# Patient Record
Sex: FEMALE | Race: WHITE | NOT HISPANIC OR LATINO | Employment: FULL TIME | URBAN - METROPOLITAN AREA
[De-identification: names, ages, dates, MRNs, and addresses within clinical notes are randomized per-mention and may not be internally consistent; named-entity substitution may affect disease eponyms.]

---

## 2021-02-23 ENCOUNTER — IMMUNIZATION (OUTPATIENT)
Dept: VACCINE CLINIC | Facility: HOSPITAL | Age: 51
End: 2021-02-23

## 2021-02-23 PROCEDURE — 91300 HC SARSCOV02 VAC 30MCG/0.3ML IM: CPT | Performed by: INTERNAL MEDICINE

## 2021-02-23 PROCEDURE — 0001A: CPT | Performed by: INTERNAL MEDICINE

## 2021-02-24 ENCOUNTER — APPOINTMENT (OUTPATIENT)
Dept: VACCINE CLINIC | Facility: HOSPITAL | Age: 51
End: 2021-02-24

## 2021-03-16 ENCOUNTER — IMMUNIZATION (OUTPATIENT)
Dept: VACCINE CLINIC | Facility: HOSPITAL | Age: 51
End: 2021-03-16

## 2021-03-16 PROCEDURE — 91300 HC SARSCOV02 VAC 30MCG/0.3ML IM: CPT | Performed by: INTERNAL MEDICINE

## 2021-03-16 PROCEDURE — 0002A: CPT | Performed by: INTERNAL MEDICINE

## 2023-06-06 ENCOUNTER — TRANSCRIBE ORDERS (OUTPATIENT)
Dept: GENERAL RADIOLOGY | Facility: HOSPITAL | Age: 53
End: 2023-06-06
Payer: COMMERCIAL

## 2023-06-06 ENCOUNTER — HOSPITAL ENCOUNTER (OUTPATIENT)
Dept: GENERAL RADIOLOGY | Facility: HOSPITAL | Age: 53
Discharge: HOME | End: 2023-06-06
Admitting: INTERNAL MEDICINE
Payer: COMMERCIAL

## 2023-06-06 DIAGNOSIS — M79.642 LEFT HAND PAIN: Primary | ICD-10-CM

## 2023-06-06 DIAGNOSIS — M79.642 LEFT HAND PAIN: ICD-10-CM

## 2023-06-06 PROCEDURE — 73130 X-RAY EXAM OF HAND: CPT

## 2023-08-28 ENCOUNTER — OFFICE VISIT (OUTPATIENT)
Dept: SLEEP MEDICINE | Facility: CLINIC | Age: 53
End: 2023-08-28
Payer: COMMERCIAL

## 2023-08-28 VITALS — WEIGHT: 159 LBS | OXYGEN SATURATION: 96 % | HEIGHT: 67 IN | HEART RATE: 90 BPM | BODY MASS INDEX: 24.96 KG/M2

## 2023-08-28 DIAGNOSIS — R06.83 SNORING: Primary | ICD-10-CM

## 2023-08-28 PROCEDURE — 99204 OFFICE O/P NEW MOD 45 MIN: CPT | Performed by: INTERNAL MEDICINE

## 2023-08-28 RX ORDER — THIAMINE HCL 100 MG
TABLET ORAL DAILY
COMMUNITY

## 2023-08-28 RX ORDER — VITAMIN A, ASCORBIC ACID, CHOLECALCIFEROL, ALPHA-TOCOPHEROL ACETATE, THIAMINE HYDROCHLORIDE, RIBOFLAVIN 5-PHOSPHATE SODIUM, NIACINAMIDE, PYRIDOXINE HYDROCHLORIDE, FERROUS SULFATE AND SODIUM FLUORIDE 1500; 35; 400; 5; .5; .6; 8; .4; 10; .25 [IU]/ML; MG/ML; [IU]/ML; [IU]/ML; MG/ML; MG/ML; MG/ML; MG/ML; MG/ML; MG/ML
LIQUID ORAL
COMMUNITY

## 2023-08-28 RX ORDER — VITAMIN E (DL,TOCOPHERYL ACET) 45 MG/0.25
DROPS ORAL
COMMUNITY

## 2023-08-28 NOTE — PROGRESS NOTES
New Sleep Patient Office Visit      Patient Name: Mary Pierce    Referring Physician: No ref. provider found    Chief Complaint:    Chief Complaint   Patient presents with    Sleeping Problem       History of Present Illness: Mary Pierce is a 52 y.o. female who is here today to establish care with Sleep Medicine.     52-year-old female with no significant past medical history presenting here for initial evaluation.  Patient states that her  was recently diagnosed with sleep apnea and she got concerned about as she also has history of snoring.  Patient has been complains about her snoring and more so now since he is on CPAP therapy himself and patient says snoring bothers him.  Patient denies any snore arousals.  Denies any waking up episodes gasping for air.  She states that normally she goes to sleep easily within 10 to 20 minutes going to bed.  She then except maybe once during the night to use the restroom.  Occasionally she is having some feeling like hot flashes during the night especially if she has caffeine late in the evening but that is not happening that frequently.  She denies any waking up with shortness of breath episodes.  When she wakes up in the morning it takes her about 15 to 20 minutes to get out of bed but once she is up she does well.  She works from home as a  and states that work has been going well.  Denies any problem doing her daily work activities due to sleep.  Denies any driving problems or sleep-related accidents.  States that she is very active and hiking and does not have any issues doing any of those activity due to fatigue or sleepiness.  She has occasional dry mouth when she wakes up.  Denies any headaches.  Denies any heartburn or reflux symptoms.  Denies any restless leg symptoms.  No other parasomnias or symptoms concerning for narcolepsy.  Denies any driving problems or sleep-related accidents.    Patient does not smoke.   Occasional alcohol intake maybe 2-4 times a month with 1 to 2 glasses of wine.  No other illicit drug use.  Occasional tea intake and occasional soda.  Denies any excessive caffeine intake either.    Further sleep history is as below.    [place] Scale: 5/24    Estimated average amount of sleep per night: 7-8 h  Number of times  she wakes up at night: 1  Difficulty falling back asleep: no  It usually takes 10 minutes to go to sleep.  She feels sleepy upon waking up: occasional  Rotating or night shift work: no    Drowsiness/Sleepiness:  She exhibits the following:  excessive daytime fatigue    Snoring/Breathing:  She exhibits the following:  loud snoring  snores in all sleep positions  awoken with dry mouth    Reflux:  She describes the following:  NA    Narcolepsy:  She exhibits the following:  none    RLS/PLMs:  She describes the following:  none    Insomnia:  She describes the following:  NA    Parasomnia:  She exhibits the following:  NA    Weight:  Weight change in the last year:  None    Subjective      Review of Systems:   Review of Systems   Constitutional: Negative.    HENT: Negative.     Respiratory: Negative.     Cardiovascular: Negative.    Gastrointestinal: Negative.    Endocrine: Negative.    Musculoskeletal: Negative.    Skin: Negative.    Neurological: Negative.    Hematological: Negative.    Psychiatric/Behavioral: Negative.     All other systems reviewed and are negative.    Past Medical History: History reviewed. No pertinent past medical history.    Past Surgical History:   Past Surgical History:   Procedure Laterality Date    HYSTERECTOMY         Family History:   Family History   Problem Relation Age of Onset    Cancer Mother     Obesity Mother     Hypertension Mother     Sleep apnea Father     Hypertension Father     Parkinsonism Maternal Grandmother        Social History:   Social History     Socioeconomic History    Marital status:    Tobacco Use    Smoking status: Never    Smokeless  "tobacco: Never   Substance and Sexual Activity    Alcohol use: Yes    Drug use: Never       Medications:     Current Outpatient Medications:     Calcium Citrate-Vitamin D3 (CITRACAL) 315-6.25 MG-MCG tablet tablet, Take  by mouth Daily., Disp: , Rfl:     Ped Multivitamins-Fl-Iron (Multi-Vitamin/Fluoride/Iron) 0.25-10 MG/ML solution, Take  by mouth., Disp: , Rfl:     Probiotic Product (Probiotic 10 Ultra Strength) capsule, Take  by mouth., Disp: , Rfl:     Allergies:   No Known Allergies    Objective     Physical Exam:  Vital Signs:   Vitals:    08/28/23 1530   Pulse: 90   SpO2: 96%   Weight: 72.1 kg (159 lb)   Height: 170.2 cm (67\")     Body mass index is 24.9 kg/mý.    Physical Exam  Vitals and nursing note reviewed.   Constitutional:       General: She is not in acute distress.     Appearance: She is well-developed. She is not diaphoretic.   HENT:      Head: Normocephalic and atraumatic.      Comments: Mallampati 2 airway, redundant soft palate, Mild overbite. Mild retrognathia     Nose: Nose normal.      Mouth/Throat:      Pharynx: No oropharyngeal exudate.   Eyes:      General:         Right eye: No discharge.         Left eye: No discharge.      Pupils: Pupils are equal, round, and reactive to light.   Neck:      Thyroid: No thyromegaly.      Trachea: No tracheal deviation.   Cardiovascular:      Rate and Rhythm: Normal rate and regular rhythm.      Heart sounds: Normal heart sounds. No murmur heard.    No friction rub. No gallop.   Pulmonary:      Effort: Pulmonary effort is normal. No respiratory distress.      Breath sounds: Normal breath sounds. No wheezing or rales.   Musculoskeletal:         General: No tenderness.      Cervical back: Neck supple.      Right lower leg: No edema.      Left lower leg: No edema.   Neurological:      Mental Status: She is alert and oriented to person, place, and time.      Cranial Nerves: No cranial nerve deficit.   Psychiatric:         Behavior: Behavior normal.         " Thought Content: Thought content normal.         Judgment: Judgment normal.       Results Review:   I reviewed the patient's new clinical results.  No results found for: TSH      Assessment / Plan      Assessment:   Problem List Items Addressed This Visit    None  Visit Diagnoses       Snoring    -  Primary    Relevant Orders    Home Sleep Study              Plan:   1.  Patient presenting with complains of loud snoring which is affecting her and her  sleep and obviously concerned about sleep disordered breathing.  She does have a.  Abnormalities would mild retrognathia and overbite which puts her at high risk of snoring and sleep disordered breathing.  Mallampati 2 airway noted as well. All this put her at high risk of sleep disordered breathing.  Discussed at length about pathophysiology of sleep apnea.  Discussed side effects of untreated sleep apnea including poor quality sleep, cardiovascular, neurologic and metabolic side effects also discussed.  Further diagnostic testing recommended.  Patient is amenable to proceed with further testing and treatment as needed.  We discussed home study versus in lab study.  Patient is amenable to proceeding with home-based testing after our discussion currently.  We will set her up for that and follow-up closely after.     2.  If found to have significant sleep apnea available treatment options discussed including CPAP therapy, oral appliance, surgical options.  Weight loss as a treatment option for sleep apnea also discussed and counseled.     3.  Discussed that if it is primary snoring then she could get by with mandibular advancement device.  Positional therapy could also be considered if sleep study will suggest that.  She is thinking of getting Invisalign due to overbite and also recent teeth shifting.  Discussed that likely she will not be able to do both therapies at the same time so we will have to come up with a different plan.  We will follow her after her  sleep study, results and discuss further management options.    4.  My concern for sleep disordered breathing is moderate in this patient.  If home study is negative we will not proceed with any further diagnostic testing as patient is not symptomatic and no other health conditions which could be attributed to sleep apnea.      Thank you for allowing me to participate in the care of this patient.  We will follow her closely.      Follow Up:   After HST    Discussed plan of care in detail with patient today. Patient verbally understands and agrees.        Atilio García MD  Pulmonary Critical Care and Sleep Medicine

## 2023-11-08 ENCOUNTER — HOSPITAL ENCOUNTER (OUTPATIENT)
Dept: SLEEP MEDICINE | Facility: HOSPITAL | Age: 53
End: 2023-11-08
Payer: COMMERCIAL

## 2023-11-08 VITALS — HEIGHT: 67 IN | BODY MASS INDEX: 24.96 KG/M2 | WEIGHT: 159 LBS

## 2023-11-08 DIAGNOSIS — R06.83 SNORING: ICD-10-CM

## 2023-11-08 PROCEDURE — 95800 SLP STDY UNATTENDED: CPT

## 2023-11-13 ENCOUNTER — TELEPHONE (OUTPATIENT)
Dept: SLEEP MEDICINE | Facility: HOSPITAL | Age: 53
End: 2023-11-13
Payer: COMMERCIAL

## 2023-11-13 NOTE — TELEPHONE ENCOUNTER
"Relay   LVM to return call  \"Available data is not suggestive of significant obstructive sleep apnea.  Patient had mild snoring.  Home Sleep Test can underestimate the severity of sleep apnea.  If clinical concern for sleep disordered breathing remains then consider in lab study to further evaluate. \"                "

## 2023-11-14 NOTE — TELEPHONE ENCOUNTER
PATIENT RETURNED CALL AND IS SATISFIED WITH THE RESULTS FROM HER SLEEP STUDY.  PATIENT IS NOT INTERESTED IN AN INLAB STUDY AT THIS TIME.  PATIENT WILL CALL IF ANY ADDITIONAL CONCERNS COME UP IN THE NEXT YEAR.

## 2024-09-17 ENCOUNTER — TRANSCRIBE ORDERS (OUTPATIENT)
Dept: GENERAL RADIOLOGY | Facility: HOSPITAL | Age: 54
End: 2024-09-17
Payer: COMMERCIAL

## 2024-09-17 ENCOUNTER — HOSPITAL ENCOUNTER (OUTPATIENT)
Dept: GENERAL RADIOLOGY | Facility: HOSPITAL | Age: 54
Discharge: HOME | End: 2024-09-17
Admitting: INTERNAL MEDICINE
Payer: COMMERCIAL

## 2024-09-17 DIAGNOSIS — R23.2 HOT FLASHES: ICD-10-CM

## 2024-09-17 DIAGNOSIS — R23.2 HOT FLASHES: Primary | ICD-10-CM

## 2024-09-17 PROCEDURE — 71046 X-RAY EXAM CHEST 2 VIEWS: CPT
